# Patient Record
Sex: MALE | Race: WHITE | NOT HISPANIC OR LATINO | Employment: FULL TIME | URBAN - METROPOLITAN AREA
[De-identification: names, ages, dates, MRNs, and addresses within clinical notes are randomized per-mention and may not be internally consistent; named-entity substitution may affect disease eponyms.]

---

## 2022-10-27 ENCOUNTER — HOSPITAL ENCOUNTER (EMERGENCY)
Facility: HOSPITAL | Age: 27
Discharge: HOME | End: 2022-10-27
Attending: EMERGENCY MEDICINE
Payer: MEDICAID

## 2022-10-27 VITALS
WEIGHT: 200 LBS | RESPIRATION RATE: 18 BRPM | TEMPERATURE: 100 F | BODY MASS INDEX: 31.39 KG/M2 | SYSTOLIC BLOOD PRESSURE: 127 MMHG | HEIGHT: 67 IN | DIASTOLIC BLOOD PRESSURE: 85 MMHG | OXYGEN SATURATION: 98 % | HEART RATE: 98 BPM

## 2022-10-27 VITALS
OXYGEN SATURATION: 98 % | SYSTOLIC BLOOD PRESSURE: 148 MMHG | WEIGHT: 198 LBS | HEART RATE: 78 BPM | RESPIRATION RATE: 18 BRPM | DIASTOLIC BLOOD PRESSURE: 87 MMHG | BODY MASS INDEX: 31.08 KG/M2 | TEMPERATURE: 99 F | HEIGHT: 67 IN

## 2022-10-27 DIAGNOSIS — R31.9 HEMATURIA, UNSPECIFIED TYPE: ICD-10-CM

## 2022-10-27 DIAGNOSIS — J10.1 INFLUENZA A: Primary | ICD-10-CM

## 2022-10-27 DIAGNOSIS — R05.9 COUGH: ICD-10-CM

## 2022-10-27 DIAGNOSIS — Z20.2 STD EXPOSURE: Primary | ICD-10-CM

## 2022-10-27 LAB
BACTERIA #/AREA URNS HPF: NEGATIVE /HPF
BILIRUB UR QL STRIP: NEGATIVE
CLARITY UR: CLEAR
COLOR UR: YELLOW
GLUCOSE UR QL STRIP: NEGATIVE
GROUP A STREP, MOLECULAR: NEGATIVE
HGB UR QL STRIP: ABNORMAL
HYALINE CASTS #/AREA URNS LPF: 5 /LPF
INFLUENZA A, MOLECULAR: POSITIVE
INFLUENZA B, MOLECULAR: NEGATIVE
KETONES UR QL STRIP: NEGATIVE
LEUKOCYTE ESTERASE UR QL STRIP: NEGATIVE
MICROSCOPIC COMMENT: ABNORMAL
NITRITE UR QL STRIP: NEGATIVE
PH UR STRIP: 7 [PH] (ref 5–8)
PROT UR QL STRIP: ABNORMAL
RBC #/AREA URNS HPF: >100 /HPF (ref 0–4)
SARS-COV-2 RDRP RESP QL NAA+PROBE: NEGATIVE
SP GR UR STRIP: >1.03 (ref 1–1.03)
SPECIMEN SOURCE: ABNORMAL
SQUAMOUS #/AREA URNS HPF: 2 /HPF
URN SPEC COLLECT METH UR: ABNORMAL
UROBILINOGEN UR STRIP-ACNC: NEGATIVE EU/DL
WBC #/AREA URNS HPF: 4 /HPF (ref 0–5)

## 2022-10-27 PROCEDURE — 87651 STREP A DNA AMP PROBE: CPT | Performed by: NURSE PRACTITIONER

## 2022-10-27 PROCEDURE — 87502 INFLUENZA DNA AMP PROBE: CPT | Performed by: NURSE PRACTITIONER

## 2022-10-27 PROCEDURE — 25000003 PHARM REV CODE 250: Performed by: EMERGENCY MEDICINE

## 2022-10-27 PROCEDURE — 87591 N.GONORRHOEAE DNA AMP PROB: CPT | Performed by: NURSE PRACTITIONER

## 2022-10-27 PROCEDURE — 81001 URINALYSIS AUTO W/SCOPE: CPT | Performed by: NURSE PRACTITIONER

## 2022-10-27 PROCEDURE — 99283 EMERGENCY DEPT VISIT LOW MDM: CPT | Mod: 25

## 2022-10-27 PROCEDURE — U0002 COVID-19 LAB TEST NON-CDC: HCPCS | Performed by: NURSE PRACTITIONER

## 2022-10-27 PROCEDURE — 87491 CHLMYD TRACH DNA AMP PROBE: CPT | Performed by: NURSE PRACTITIONER

## 2022-10-27 RX ORDER — CEFTRIAXONE 1 G/1
500 INJECTION, POWDER, FOR SOLUTION INTRAMUSCULAR; INTRAVENOUS
Status: DISCONTINUED | OUTPATIENT
Start: 2022-10-27 | End: 2022-10-28 | Stop reason: HOSPADM

## 2022-10-27 RX ORDER — ONDANSETRON 4 MG/1
4 TABLET, FILM COATED ORAL EVERY 8 HOURS PRN
Qty: 15 TABLET | Refills: 0 | Status: SHIPPED | OUTPATIENT
Start: 2022-10-27 | End: 2023-03-22 | Stop reason: ALTCHOICE

## 2022-10-27 RX ORDER — DOXYCYCLINE 100 MG/1
100 CAPSULE ORAL 2 TIMES DAILY
Qty: 20 CAPSULE | Refills: 0 | Status: SHIPPED | OUTPATIENT
Start: 2022-10-27 | End: 2022-11-06

## 2022-10-27 RX ORDER — ONDANSETRON 4 MG/1
4 TABLET, ORALLY DISINTEGRATING ORAL
Status: COMPLETED | OUTPATIENT
Start: 2022-10-27 | End: 2022-10-27

## 2022-10-27 RX ORDER — DOXYCYCLINE 100 MG/1
100 CAPSULE ORAL EVERY 12 HOURS
Status: DISCONTINUED | OUTPATIENT
Start: 2022-10-27 | End: 2022-10-28 | Stop reason: HOSPADM

## 2022-10-27 RX ADMIN — ONDANSETRON 4 MG: 4 TABLET, ORALLY DISINTEGRATING ORAL at 01:10

## 2022-10-27 NOTE — ED PROVIDER NOTES
Encounter Date: 10/27/2022       History     Chief Complaint   Patient presents with    Generalized Body Aches     X 4 DAYS    Cough    Sore Throat    Nausea     27-year-old male presents emergency department with generalized body aches for the last 4 days.  He has had a cough, he says sore throat he has had nausea vomiting and diarrhea.  Says the nausea started yesterday 2 episodes of nonbloody nonbilious emesis yesterday.  One today.  He has had loose watery stool 4-5 episodes per day.  He has also had muscle aches and body aches.  He has had a headache.  He has had cough of green sputum.  No one else sick at home with similar symptoms.  He has no comorbidities.  No significant past medical history.    Review of patient's allergies indicates:   Allergen Reactions    Ibuprofen      No past medical history on file.  No past surgical history on file.  No family history on file.     Review of Systems   Constitutional:  Positive for chills, fatigue and fever.   HENT:  Positive for congestion and sore throat. Negative for ear pain.    Respiratory:  Positive for cough. Negative for shortness of breath.    Cardiovascular:  Negative for chest pain.   Gastrointestinal:  Positive for diarrhea, nausea and vomiting.   Genitourinary:  Negative for dysuria.   Musculoskeletal:  Positive for myalgias. Negative for back pain.   Skin:  Negative for rash.   Neurological:  Positive for headaches. Negative for seizures and weakness.   Hematological:  Does not bruise/bleed easily.   All other systems reviewed and are negative.    Physical Exam     Initial Vitals [10/27/22 1207]   BP Pulse Resp Temp SpO2   (!) 133/90 106 18 99.9 °F (37.7 °C) 97 %      MAP       --         Physical Exam    Nursing note and vitals reviewed.  Constitutional: He appears well-developed and well-nourished. He is not diaphoretic. No distress.   HENT:   Head: Normocephalic and atraumatic.   Mouth/Throat: Oropharynx is clear and moist. No oropharyngeal exudate.    Posterior pharyngeal erythema noted.  No exudate.   Eyes: Conjunctivae and EOM are normal. Pupils are equal, round, and reactive to light.   Neck: Neck supple. No tracheal deviation present.   No meningismus   Normal range of motion.  Cardiovascular:  Normal rate, regular rhythm, normal heart sounds and intact distal pulses.           No murmur heard.  Pulmonary/Chest: Breath sounds normal. No stridor. No respiratory distress. He has no wheezes. He has no rhonchi. He has no rales.   Abdominal: Abdomen is soft. Bowel sounds are normal. He exhibits no distension. There is no abdominal tenderness. There is no rebound and no guarding.   Musculoskeletal:         General: No tenderness or edema. Normal range of motion.      Cervical back: Normal range of motion and neck supple.     Lymphadenopathy:     He has no cervical adenopathy.   Neurological: He is alert and oriented to person, place, and time. He has normal strength. No cranial nerve deficit or sensory deficit. GCS score is 15. GCS eye subscore is 4. GCS verbal subscore is 5. GCS motor subscore is 6.   Skin: Skin is warm and dry. Capillary refill takes less than 2 seconds. No rash noted. No erythema. No pallor.   Psychiatric: He has a normal mood and affect. His behavior is normal. Thought content normal.       ED Course   Procedures  Labs Reviewed   INFLUENZA A AND B ANTIGEN - Abnormal; Notable for the following components:       Result Value    Influenza A, Molecular Positive (*)     All other components within normal limits    Narrative:     Specimen Source->Nasopharyngeal Swab   FLU A POS  critical result(s) called and verbal readback obtained   from Lizz Rodriguez RN-ED by CD3 10/27/2022 13:34   GROUP A STREP, MOLECULAR   SARS-COV-2 RNA AMPLIFICATION, QUAL          Results for orders placed or performed during the hospital encounter of 10/27/22   Group A Strep, Molecular    Specimen: Throat   Result Value Ref Range    Group A Strep, Molecular Negative  Negative   COVID-19 Rapid Screening   Result Value Ref Range    SARS-CoV-2 RNA, Amplification, Qual Negative Negative   Influenza antigen Nasopharyngeal Swab   Result Value Ref Range    Influenza A, Molecular Positive (AA) Negative    Influenza B, Molecular Negative Negative    Flu A & B Source Nasal swab        Imaging Results              X-Ray Chest PA And Lateral (Final result)  Result time 10/27/22 13:56:15      Final result by Hernando Fernandez MD (10/27/22 13:56:15)                   Narrative:    XR CHEST 2 VIEWS    CLINICAL HISTORY:  27 years Male cough, chills, sore throat, body aches    COMPARISON: None    FINDINGS: Cardiomediastinal silhouette is within normal limits. Lungs are normally expanded with no airspace consolidation. No pleural effusion or pneumothorax. No acute osseous abnormality.    IMPRESSION: No acute pulmonary process.    Electronically signed by:  Hernando Fernandez MD  10/27/2022 1:56 PM CDT Workstation: [id number]                                  Results for orders placed or performed during the hospital encounter of 10/27/22   Group A Strep, Molecular    Specimen: Throat   Result Value Ref Range    Group A Strep, Molecular Negative Negative   COVID-19 Rapid Screening   Result Value Ref Range    SARS-CoV-2 RNA, Amplification, Qual Negative Negative   Influenza antigen Nasopharyngeal Swab   Result Value Ref Range    Influenza A, Molecular Positive (AA) Negative    Influenza B, Molecular Negative Negative    Flu A & B Source Nasal swab      X-Ray Chest PA And Lateral   Final Result             Medications   ondansetron disintegrating tablet 4 mg (4 mg Oral Given 10/27/22 1353)     Medical Decision Making:   Clinical Tests:   Lab Tests: Ordered and Reviewed  Radiological Study: Ordered and Reviewed  Medical Tests: Ordered and Reviewed  ED Management:  27-year-old male presents emergency department with flu-like symptoms.  He is positive for influenza A.  He is out of window for Tamiflu.  He  is not hypoxic or tachycardic.  He is well-appearing, nontoxic.  He is not appear clinically dehydrated.  Felt better after receiving Zofran emergency department.  He will be discharged home follow-up with his primary care provider.    I had a detailed discussion with the patient and/or guardian regarding: The historical points, exam findings, and diagnostic results supporting the discharge diagnosis, lab results, pertinent radiology results, and the need for outpatient follow-up, for definitive care with a family practitioner and to return to the emergency department if symptoms worsen or persist or if there are any questions or concerns that arise at home. All questions have been answered in detail. Strict return to Emergency Department precautions have been provided    A dictation software program was used for this note.  Please expect some simple typographical  errors in this note.                         Clinical Impression:   Final diagnoses:  [R05.9] Cough  [J10.1] Influenza A (Primary)        ED Disposition Condition    Discharge Stable          ED Prescriptions       Medication Sig Dispense Start Date End Date Auth. Provider    ondansetron (ZOFRAN) 4 MG tablet Take 1 tablet (4 mg total) by mouth every 8 (eight) hours as needed for Nausea. 15 tablet 10/27/2022 -- Justino Mooney DO          Follow-up Information       Follow up With Specialties Details Why Contact Info Additional Information    [facility] - Emergency Dept Emergency Medicine  If symptoms worsen [address] [facility] [phone] [phone] 1st floor    Your PCP  In 3 days                Justino Mooney DO  10/27/22 8710

## 2022-10-27 NOTE — DISCHARGE INSTRUCTIONS
RETURN TO EMERGENCY DEPARTMENT WITHOUT FAIL, IF YOUR SYMPTOMS WORSEN, IF YOU GET NEW OR DIFFERENT SYMPTOMS, IF YOU ARE UNABLE TO FOLLOW UP AS DIRECTED, OR IF YOU HAVE ANY CONCERNS OR WORRIES.    Take Zofran as needed for nausea and vomiting.  Take Tylenol and ibuprofen as needed for fever.  Follow-up with primary care provider.

## 2022-10-28 NOTE — ED PROVIDER NOTES
"Source of History:  Patient, aunt    Chief complaint:  Exposure to STD (Pt states he was exposed to HIV ) and Hematuria      HPI:  Darshan Segura is a 27 y.o. male presenting with hematuria and STD exposure.  Patient was seen earlier today for fever and congestion.  Patient states he was given Zofran in the ED and when he returned home he noticed blood in his urine.  Patient states he is concerned for exposure to HIV but never had any symptoms and wants to be tested.      This is the extent to the patients complaints today here in the emergency department.    ROS: As per HPI and below:  Constitutional: No fever.  No chills.  Eyes: No visual changes.   ENT: No sore throat. No ear pain.  Urinary: No abnormal urination.  Positive for hematuria  MSK: No back pain. No joint pain.   Integument: No rashes or lesions.    Review of patient's allergies indicates:   Allergen Reactions    Ibuprofen        PMH:  As per HPI and below:  No past medical history on file.  No past surgical history on file.         Physical Exam:    BP (!) 148/87 (BP Location: Right arm, Patient Position: Sitting)   Pulse 78   Temp 98.9 °F (37.2 °C) (Oral)   Resp 18   Ht 5' 7" (1.702 m)   Wt 89.8 kg (198 lb)   SpO2 98%   BMI 31.01 kg/m²   Nursing note and vital signs reviewed.  Constitutional: No acute distress.  Nontoxic  Eyes: No conjunctival injection. Extraocular muscles intact.  ENT: Normal phonation.  Musculoskeletal: Good range of motion all joints.  No deformities.  Neck supple.  No meningismus. Neurovascularly intact.  Skin: No rashes seen.  Good turgor.  No abrasions.  No ecchymoses.  Psych:  Alert and oriented x 3.  Appropriate, conversant.    Labs Reviewed   URINALYSIS, REFLEX TO URINE CULTURE - Abnormal; Notable for the following components:       Result Value    Specific Gravity, UA >1.030 (*)     Protein, UA 3+ (*)     Occult Blood UA 3+ (*)     All other components within normal limits    Narrative:     Specimen Source->Urine "   URINALYSIS MICROSCOPIC - Abnormal; Notable for the following components:    RBC, UA >100 (*)     Hyaline Casts, UA 5 (*)     All other components within normal limits    Narrative:     Specimen Source->Urine   C. TRACHOMATIS/N. GONORRHOEAE BY AMP DNA   HIV 1 / 2 ANTIBODY     I decided to obtain the patient's medical records.  Summary of Medical Records:    MDM/ Differential Dx:   Emergent evaluation of a 26 yo male presenting for hematuria.  No pain with urination.  Pt states he is concerns for possible exposure to HIV and STDs. Of note he was seen earlier today for cough, congestions and discharged home.   On exam pt is A&Ox3. VSS. Nonfebrile and nontoxic appearing.  Mucous membranes pink and moist. Tonsils with no redness, erythema or exudates. Breath sounds clear bilaterally.  Abdomen soft and nontender. No rebound or guarding appreciated on exam.   BS WNL.  Pt speaking in full sentences.  Steady gait appreciated. Cap refill < 3 seconds.        ED Course as of 10/28/22 0001   u Oct 27, 2022   2245 Alerted by nursing staff that patient family wants to see a physician and not a nurse practitioner.  Dr. Markham aware. [RZ]   6465 UA shows 3+ blood.  No leukocytosis.  No signs of infection. Pt updated on results.  Advised that we will call him with HIV and STD results if they are positive.  Will treat for GC/Chlamydia.  Pt advised he could download the my [facility] apt to get his results as soon as they are released.  Patient is in agreeance to this plan.  Strict return to ED precautions discussed.  Patient verbalized understanding.  All questions and concerns addressed.    Patient is hemodynamically stable, vital signs are normal. Discharge instructions given. Return to ED precautions discussed. Follow up as directed. Pt verbalized understanding of this plan.  Pt is stable for discharge.     [RZ]      ED Course User Index  [RZ] Brionna Chun NP               Diagnostic Impression:    1. STD exposure    2.  Hematuria, unspecified type         ED Disposition Condition    Discharge Stable            ED Prescriptions       Medication Sig Dispense Start Date End Date Auth. Provider    doxycycline (VIBRAMYCIN) 100 MG Cap Take 1 capsule (100 mg total) by mouth 2 (two) times daily. for 10 days 20 capsule 10/27/2022 11/6/2022 Brionna Chun NP          Follow-up Information       Follow up With Specialties Details Why Contact Info    [facility]  Schedule an appointment as soon as possible for a visit  As needed [address] [address] [address]  [phone]               Brionna Chun NP  10/28/22 0017

## 2022-10-28 NOTE — DISCHARGE INSTRUCTIONS
You were seen and evaluated in the ER today.  We have treated you for STD exposure.  We have tested you for HIV and you will be contacted if your test is positive.  Please follow-up with your PCP as needed.  Please return to the ED for any worsening symptoms such as chest pain, shortness of breath, fever not controlled with Tylenol or ibuprofen or uncontrolled pain.      Our goal in the emergency department is to always give you outstanding care and exceptional service. You may receive a survey by mail or e-mail in the next week regarding your experience in our ED. We would greatly appreciate your completing and returning the survey. Your feedback provides us with a way to recognize our staff who give very good care and it helps us learn how to improve when your experience was below our aspiration of excellence.

## 2022-10-30 LAB
CHLAMYDIA, AMPLIFIED DNA: NEGATIVE
N GONORRHOEAE, AMPLIFIED DNA: NEGATIVE

## 2022-11-01 ENCOUNTER — OFFICE VISIT (OUTPATIENT)
Dept: URGENT CARE | Facility: CLINIC | Age: 27
End: 2022-11-01
Payer: MEDICAID

## 2022-11-01 ENCOUNTER — TELEPHONE (OUTPATIENT)
Dept: UROLOGY | Facility: CLINIC | Age: 27
End: 2022-11-01
Payer: MEDICAID

## 2022-11-01 VITALS
WEIGHT: 184 LBS | DIASTOLIC BLOOD PRESSURE: 89 MMHG | SYSTOLIC BLOOD PRESSURE: 134 MMHG | HEIGHT: 67 IN | OXYGEN SATURATION: 98 % | BODY MASS INDEX: 28.88 KG/M2 | HEART RATE: 71 BPM | TEMPERATURE: 98 F | RESPIRATION RATE: 20 BRPM

## 2022-11-01 DIAGNOSIS — Z20.2 POTENTIAL EXPOSURE TO STD: ICD-10-CM

## 2022-11-01 DIAGNOSIS — R31.9 HEMATURIA, UNSPECIFIED TYPE: Primary | ICD-10-CM

## 2022-11-01 LAB
BILIRUB UR QL STRIP: NEGATIVE
GLUCOSE UR QL STRIP: NEGATIVE
KETONES UR QL STRIP: NEGATIVE
LEUKOCYTE ESTERASE UR QL STRIP: NEGATIVE
PH, POC UA: 5.5
POC BLOOD, URINE: POSITIVE
POC NITRATES, URINE: NEGATIVE
PROT UR QL STRIP: POSITIVE
SP GR UR STRIP: 1.02 (ref 1–1.03)
UROBILINOGEN UR STRIP-ACNC: NORMAL (ref 0.3–2.2)

## 2022-11-01 PROCEDURE — 1159F PR MEDICATION LIST DOCUMENTED IN MEDICAL RECORD: ICD-10-PCS | Mod: CPTII,S$GLB,, | Performed by: NURSE PRACTITIONER

## 2022-11-01 PROCEDURE — 1160F RVW MEDS BY RX/DR IN RCRD: CPT | Mod: CPTII,S$GLB,, | Performed by: NURSE PRACTITIONER

## 2022-11-01 PROCEDURE — 81003 POCT URINALYSIS, DIPSTICK, AUTOMATED, W/O SCOPE: ICD-10-PCS | Mod: QW,S$GLB,, | Performed by: NURSE PRACTITIONER

## 2022-11-01 PROCEDURE — 3075F SYST BP GE 130 - 139MM HG: CPT | Mod: CPTII,S$GLB,, | Performed by: NURSE PRACTITIONER

## 2022-11-01 PROCEDURE — 3079F PR MOST RECENT DIASTOLIC BLOOD PRESSURE 80-89 MM HG: ICD-10-PCS | Mod: CPTII,S$GLB,, | Performed by: NURSE PRACTITIONER

## 2022-11-01 PROCEDURE — 1159F MED LIST DOCD IN RCRD: CPT | Mod: CPTII,S$GLB,, | Performed by: NURSE PRACTITIONER

## 2022-11-01 PROCEDURE — 3075F PR MOST RECENT SYSTOLIC BLOOD PRESS GE 130-139MM HG: ICD-10-PCS | Mod: CPTII,S$GLB,, | Performed by: NURSE PRACTITIONER

## 2022-11-01 PROCEDURE — 1160F PR REVIEW ALL MEDS BY PRESCRIBER/CLIN PHARMACIST DOCUMENTED: ICD-10-PCS | Mod: CPTII,S$GLB,, | Performed by: NURSE PRACTITIONER

## 2022-11-01 PROCEDURE — 99204 OFFICE O/P NEW MOD 45 MIN: CPT | Mod: S$GLB,,, | Performed by: NURSE PRACTITIONER

## 2022-11-01 PROCEDURE — 3079F DIAST BP 80-89 MM HG: CPT | Mod: CPTII,S$GLB,, | Performed by: NURSE PRACTITIONER

## 2022-11-01 PROCEDURE — 81003 URINALYSIS AUTO W/O SCOPE: CPT | Mod: QW,S$GLB,, | Performed by: NURSE PRACTITIONER

## 2022-11-01 PROCEDURE — 99204 PR OFFICE/OUTPT VISIT, NEW, LEVL IV, 45-59 MIN: ICD-10-PCS | Mod: S$GLB,,, | Performed by: NURSE PRACTITIONER

## 2022-11-01 NOTE — PROGRESS NOTES
"Subjective:       Patient ID: Darshan Segura is a 27 y.o. male.    Vitals:  height is 5' 7" (1.702 m) and weight is 83.5 kg (184 lb). His temperature is 97.8 °F (36.6 °C). His blood pressure is 134/89 and his pulse is 71. His respiration is 20 and oxygen saturation is 98%.     Chief Complaint: Exposure to STD    Pt states" c/o dripping blood from penis that has been going on for a week. No burning, or itching just the dripping of blood from penis after urination. Has been taking Doxycycline and reports nausea from this. Has been taking ondansetron to keep medication down.    Emesis   Pertinent negatives include no abdominal pain, chest pain, chills, coughing, diarrhea, dizziness or fever.     Constitution: Negative for chills and fever.   HENT:  Negative for sore throat.    Cardiovascular:  Negative for chest pain, palpitations and sob on exertion.   Respiratory:  Negative for cough and shortness of breath.    Gastrointestinal:  Positive for vomiting. Negative for abdominal pain, nausea and diarrhea.   Genitourinary:  Positive for hematuria. Negative for dysuria, frequency, urgency, flank pain, bladder incontinence, genital sore, penile pain and testicular pain.   Musculoskeletal:  Negative for back pain.   Skin:  Negative for rash.   Neurological:  Negative for dizziness, light-headedness, passing out, disorientation and altered mental status.   Psychiatric/Behavioral:  Negative for altered mental status, disorientation and confusion.      Objective:      Physical Exam   Constitutional: He is oriented to person, place, and time. He appears well-developed. He is cooperative.  Non-toxic appearance. He does not appear ill. No distress.   HENT:   Head: Normocephalic and atraumatic.   Ears:   Right Ear: External ear normal.   Left Ear: External ear normal.   Nose: Nose normal.   Mouth/Throat: Oropharynx is clear and moist and mucous membranes are normal. Mucous membranes are moist. Oropharynx is clear.   Eyes: " Conjunctivae and lids are normal.   Neck: Trachea normal and phonation normal. Neck supple.   Cardiovascular: Normal rate, regular rhythm, normal heart sounds and normal pulses.   Pulmonary/Chest: Effort normal and breath sounds normal.   Abdominal: Normal appearance and bowel sounds are normal. He exhibits no distension and no mass. Soft. There is no abdominal tenderness. There is no rebound, no guarding, no left CVA tenderness and no right CVA tenderness. No hernia.   Musculoskeletal:         General: No deformity.   Neurological: He is alert and oriented to person, place, and time. He has normal strength and normal reflexes. No sensory deficit.   Skin: Skin is warm, dry, intact and not diaphoretic. Capillary refill takes 2 to 3 seconds.   Psychiatric: His speech is normal and behavior is normal. Judgment and thought content normal.   Nursing note and vitals reviewed.      Assessment:       1. Hematuria, unspecified type    2. Potential exposure to STD          Plan:       Nuab pending    Hematuria, unspecified type  -     Ambulatory referral/consult to Urology  -     CULTURE, URINE    Potential exposure to STD  -     POCT Urinalysis, Dipstick, Automated, W/O Scope  -     Cancel: Nuab Vaginitis Plus (VG+)  UA reviewed. Positive blood and protein. Negative nitrates, leukocytes, and ketones.       I have discussed the test results and physical exam findings with the patient. I am referring to urology for further workup for painless hematuria. The patient is hemodynamically stable at this time and I do not see any evidence for emergency care at this time. We discussed symptom monitoring, conservative care methods, medication use, and follow up orders. The patient verbalized understanding and agreement with the plan of care.         Continue taking doxycycline to completion. Take with food and ondansetron for better tolerance. Take each dose with a full glass of water.  A urine culture and other testing has been  ordered on your urine. Someone from this office will contact you with the results.  Follow up with Urology referral. Someone will call you and schedule you  for an appointment.  If you become weak, dizzy, short of breath, or have chest pain, go to the ER for evaluation.  Return to clinic for any new concern.

## 2022-11-01 NOTE — TELEPHONE ENCOUNTER
Office received referral from Dr. Tuttle for hematuria. Called patient regarding referral. No answer. Unable to LMOM. Mailbox full.

## 2022-11-01 NOTE — PATIENT INSTRUCTIONS
Continue taking doxycycline to completion. Take with food and ondansetron for better tolerance. Take each dose with a full glass of water.  A urine culture and other testing has been ordered on your urine. Someone from this office will contact you with the results.  Follow up with Urology referral. Someone will call you and schedule you  for an appointment.  If you become weak, dizzy, short of breath, or have chest pain, go to the ER for evaluation.  Return to clinic for any new concern.

## 2022-11-03 LAB
BACTERIA UR CULT: NO GROWTH
BACTERIA UR CULT: NORMAL

## 2022-11-17 ENCOUNTER — OFFICE VISIT (OUTPATIENT)
Dept: FAMILY MEDICINE | Facility: CLINIC | Age: 27
End: 2022-11-17
Payer: MEDICAID

## 2022-11-17 VITALS
HEART RATE: 52 BPM | HEIGHT: 67 IN | WEIGHT: 188.69 LBS | RESPIRATION RATE: 16 BRPM | OXYGEN SATURATION: 99 % | BODY MASS INDEX: 29.62 KG/M2 | DIASTOLIC BLOOD PRESSURE: 66 MMHG | TEMPERATURE: 98 F | SYSTOLIC BLOOD PRESSURE: 112 MMHG

## 2022-11-17 DIAGNOSIS — R31.0 GROSS HEMATURIA: Primary | ICD-10-CM

## 2022-11-17 DIAGNOSIS — Z20.2 POSSIBLE EXPOSURE TO STD: ICD-10-CM

## 2022-11-17 DIAGNOSIS — Z23 NEEDS FLU SHOT: ICD-10-CM

## 2022-11-17 DIAGNOSIS — Z00.00 ROUTINE HEALTH MAINTENANCE: ICD-10-CM

## 2022-11-17 DIAGNOSIS — R00.1 BRADYCARDIA WITH 41-50 BEATS PER MINUTE: ICD-10-CM

## 2022-11-17 LAB
BILIRUB UR QL STRIP: NEGATIVE
GLUCOSE UR QL STRIP: NEGATIVE
KETONES UR QL STRIP: NEGATIVE
LEUKOCYTE ESTERASE UR QL STRIP: NEGATIVE
PH, POC UA: 6 (ref 5–8.5)
POC BLOOD, URINE: NEGATIVE
POC NITRATES, URINE: NEGATIVE
PROT UR QL STRIP: POSITIVE
SP GR UR STRIP: 1.02 (ref 1–1.03)
UROBILINOGEN UR STRIP-ACNC: NORMAL (ref 0.2–8)

## 2022-11-17 PROCEDURE — 99213 OFFICE O/P EST LOW 20 MIN: CPT | Performed by: NURSE PRACTITIONER

## 2022-11-17 PROCEDURE — 90471 IMMUNIZATION ADMIN: CPT | Mod: PBBFAC | Performed by: NURSE PRACTITIONER

## 2022-11-17 PROCEDURE — 3008F PR BODY MASS INDEX (BMI) DOCUMENTED: ICD-10-PCS | Mod: CPTII,,, | Performed by: NURSE PRACTITIONER

## 2022-11-17 PROCEDURE — 1159F PR MEDICATION LIST DOCUMENTED IN MEDICAL RECORD: ICD-10-PCS | Mod: CPTII,,, | Performed by: NURSE PRACTITIONER

## 2022-11-17 PROCEDURE — 3078F DIAST BP <80 MM HG: CPT | Mod: CPTII,,, | Performed by: NURSE PRACTITIONER

## 2022-11-17 PROCEDURE — 3078F PR MOST RECENT DIASTOLIC BLOOD PRESSURE < 80 MM HG: ICD-10-PCS | Mod: CPTII,,, | Performed by: NURSE PRACTITIONER

## 2022-11-17 PROCEDURE — 99204 OFFICE O/P NEW MOD 45 MIN: CPT | Mod: S$PBB,,, | Performed by: NURSE PRACTITIONER

## 2022-11-17 PROCEDURE — 1159F MED LIST DOCD IN RCRD: CPT | Mod: CPTII,,, | Performed by: NURSE PRACTITIONER

## 2022-11-17 PROCEDURE — 3008F BODY MASS INDEX DOCD: CPT | Mod: CPTII,,, | Performed by: NURSE PRACTITIONER

## 2022-11-17 PROCEDURE — 3074F SYST BP LT 130 MM HG: CPT | Mod: CPTII,,, | Performed by: NURSE PRACTITIONER

## 2022-11-17 PROCEDURE — 99204 PR OFFICE/OUTPT VISIT, NEW, LEVL IV, 45-59 MIN: ICD-10-PCS | Mod: S$PBB,,, | Performed by: NURSE PRACTITIONER

## 2022-11-17 PROCEDURE — 3074F PR MOST RECENT SYSTOLIC BLOOD PRESSURE < 130 MM HG: ICD-10-PCS | Mod: CPTII,,, | Performed by: NURSE PRACTITIONER

## 2022-11-17 PROCEDURE — 81003 URINALYSIS AUTO W/O SCOPE: CPT | Mod: PBBFAC | Performed by: NURSE PRACTITIONER

## 2022-11-17 NOTE — LETTER
November 17, 2022      [facility] / Internal Medicine  [address] [address]  Phone: [phone]  Fax: [phone]       Patient: Darshan Segura   YOB: 1995  Date of Visit: 11/17/2022    To Whom It May Concern:    Laurie Segura  was at [facility] on 11/17/2022. The patient may return to work/school on 11/18/2022 with no restrictions. If you have any questions or concerns, or if I can be of further assistance, please do not hesitate to contact me.    Sincerely,        Yanet Nash MA

## 2022-11-17 NOTE — PROGRESS NOTES
Patient ID: Darshan Segura is a 27 y.o. male.    Chief Complaint: Hematuria and Transitional Care    Mr. Segura presents today to establish care with me as his PCP. He states he was seen several weeks ago in ED for blood in urine. He states he was possibly exposed to STD and at that time he was started on Doxycycline. He states it happened a few times after starting antibiotics but it subsided and has not happened since. He denies any pain on urination. He also denies fever, chills, night sweats or infectious morbidity. We reviewed his health history and he states he is overall healthy. His family history was reviewed as well with no pertinent issues.     Hematuria  This is a new problem. The current episode started 1 to 4 weeks ago. The problem has been resolved since onset. He describes the hematuria as gross hematuria. The hematuria occurs during the terminal portion of his urinary stream. He reports no clotting in his urine stream. His pain is at a severity of 0/10. He is experiencing no pain. He describes his urine color as tea colored. Irritative symptoms do not include frequency, nocturia or urgency. Pertinent negatives include no abdominal pain, dysuria, facial swelling, fever, flank pain, inability to urinate, nausea, vomiting or sore throat. He is sexually active. His past medical history is significant for recent infection and STDs.     History reviewed. No pertinent past medical history.  History reviewed. No pertinent surgical history.      Tobacco History:  reports that he has been smoking cigarettes and vaping with nicotine. He has a 7.00 pack-year smoking history. He does not have any smokeless tobacco history on file.      Review of patient's allergies indicates:   Allergen Reactions    Ibuprofen        Current Outpatient Medications:     ondansetron (ZOFRAN) 4 MG tablet, Take 1 tablet (4 mg total) by mouth every 8 (eight) hours as needed for Nausea. (Patient not taking: Reported on 11/17/2022), Disp:  "15 tablet, Rfl: 0    Review of Systems   Constitutional:  Negative for activity change, appetite change, fatigue and fever.   HENT:  Negative for congestion, dental problem, facial swelling, nosebleeds, postnasal drip, rhinorrhea, sinus pain, sore throat and tinnitus.    Eyes:  Negative for pain, discharge, itching and visual disturbance.   Respiratory:  Negative for cough, chest tightness, shortness of breath and wheezing.    Cardiovascular:  Negative for chest pain.   Gastrointestinal:  Negative for abdominal pain, blood in stool, constipation, diarrhea, nausea and vomiting.   Endocrine: Negative for cold intolerance, heat intolerance, polydipsia, polyphagia and polyuria.   Genitourinary:  Positive for hematuria. Negative for difficulty urinating, dysuria, flank pain, frequency, genital sores, nocturia and urgency.   Musculoskeletal:  Negative for arthralgias and myalgias.   Skin:  Negative for rash and wound.   Allergic/Immunologic: Negative for environmental allergies and food allergies.   Neurological:  Negative for dizziness, light-headedness and headaches.   Hematological:  Negative for adenopathy. Does not bruise/bleed easily.   Psychiatric/Behavioral:  Negative for behavioral problems, confusion, decreased concentration, sleep disturbance and suicidal ideas. The patient is not nervous/anxious and is not hyperactive.         Objective:      Vitals:    11/17/22 1414 11/17/22 1441   BP: 112/66    Pulse: (!) 48 (!) 52   Resp: 16    Temp: 98.1 °F (36.7 °C)    TempSrc: Oral    SpO2: 99%    Weight: 85.6 kg (188 lb 11.2 oz)    Height: 5' 7" (1.702 m)      Physical Exam  Vitals reviewed.   Constitutional:       General: He is not in acute distress.     Appearance: Normal appearance. He is normal weight. He is not ill-appearing, toxic-appearing or diaphoretic.   HENT:      Head: Normocephalic and atraumatic.      Right Ear: Tympanic membrane and external ear normal. There is no impacted cerumen.      Left Ear: " Tympanic membrane and external ear normal. There is no impacted cerumen.      Nose: Nose normal. No congestion or rhinorrhea.      Mouth/Throat:      Mouth: Mucous membranes are moist.      Pharynx: Oropharynx is clear. No oropharyngeal exudate or posterior oropharyngeal erythema.   Eyes:      General: No scleral icterus.        Right eye: No discharge.         Left eye: No discharge.      Extraocular Movements: Extraocular movements intact.      Conjunctiva/sclera: Conjunctivae normal.      Pupils: Pupils are equal, round, and reactive to light.   Cardiovascular:      Rate and Rhythm: Normal rate and regular rhythm.      Pulses: Normal pulses.      Heart sounds: Normal heart sounds. No murmur heard.    No friction rub. No gallop.   Pulmonary:      Effort: Pulmonary effort is normal. No respiratory distress.      Breath sounds: Normal breath sounds. No wheezing, rhonchi or rales.   Chest:      Chest wall: No tenderness.   Abdominal:      General: Abdomen is flat. Bowel sounds are normal.      Palpations: Abdomen is soft. There is no mass.      Tenderness: There is no abdominal tenderness. There is no guarding or rebound.   Musculoskeletal:         General: No swelling or signs of injury. Normal range of motion.      Cervical back: Normal range of motion and neck supple. No rigidity or tenderness.      Right lower leg: No edema.      Left lower leg: No edema.   Skin:     General: Skin is warm and dry.      Capillary Refill: Capillary refill takes less than 2 seconds.      Coloration: Skin is not pale.      Findings: No bruising or erythema.   Neurological:      General: No focal deficit present.      Mental Status: He is alert and oriented to person, place, and time. Mental status is at baseline.      Motor: No weakness.      Coordination: Coordination normal.      Gait: Gait normal.   Psychiatric:         Mood and Affect: Mood normal.         Behavior: Behavior normal.         Thought Content: Thought content  normal.         Judgment: Judgment normal.         Assessment:       1. Gross hematuria    2. Routine health maintenance    3. Needs flu shot    4. Possible exposure to STD    5. Bradycardia with 41-50 beats per minute           Plan:       Gross hematuria  -     POCT Urinalysis, Dipstick, Automated, W/O Scope  -     Urinalysis, Reflex to Urine Culture Urine, Clean Catch; Future; Expected date: 11/17/2022    Routine health maintenance  -     CBC auto differential; Future; Expected date: 11/17/2022  -     Comprehensive Metabolic Panel; Future; Expected date: 11/17/2022  -     Lipid Panel; Future; Expected date: 11/17/2022  -     TSH; Future; Expected date: 11/17/2022    Needs flu shot  -     Influenza - Quadrivalent *Preferred* (6 months+) (PF)    Possible exposure to STD  -     Hepatitis C Antibody; Future; Expected date: 11/17/2022  -     HIV 1/2 Ag/Ab (4th Gen); Future; Expected date: 11/17/2022    Bradycardia with 41-50 beats per minute  -     EKG 12-lead; Future; Expected date: 11/24/2022      Follow up in about 3 months (around 2/17/2023), or if symptoms worsen or fail to improve, for Lab review/Hematuria.        11/17/2022 Tiera Rosa NP

## 2023-02-20 ENCOUNTER — LAB VISIT (OUTPATIENT)
Dept: LAB | Facility: HOSPITAL | Age: 28
End: 2023-02-20
Attending: NURSE PRACTITIONER
Payer: MEDICAID

## 2023-02-20 DIAGNOSIS — Z20.2 POSSIBLE EXPOSURE TO STD: ICD-10-CM

## 2023-02-20 DIAGNOSIS — Z00.00 ROUTINE HEALTH MAINTENANCE: ICD-10-CM

## 2023-02-20 DIAGNOSIS — R31.0 GROSS HEMATURIA: ICD-10-CM

## 2023-02-20 LAB
ALBUMIN SERPL BCP-MCNC: 4.5 G/DL (ref 3.5–5.2)
ALP SERPL-CCNC: 56 U/L (ref 55–135)
ALT SERPL W/O P-5'-P-CCNC: 20 U/L (ref 10–44)
ANION GAP SERPL CALC-SCNC: 10 MMOL/L (ref 8–16)
AST SERPL-CCNC: 21 U/L (ref 10–40)
BASOPHILS # BLD AUTO: 0.01 K/UL (ref 0–0.2)
BASOPHILS NFR BLD: 0.3 % (ref 0–1.9)
BILIRUB SERPL-MCNC: 0.6 MG/DL (ref 0.1–1)
BILIRUB UR QL STRIP: NEGATIVE
BUN SERPL-MCNC: 16 MG/DL (ref 6–20)
CALCIUM SERPL-MCNC: 9.8 MG/DL (ref 8.7–10.5)
CHLORIDE SERPL-SCNC: 102 MMOL/L (ref 95–110)
CHOLEST SERPL-MCNC: 299 MG/DL (ref 120–199)
CHOLEST/HDLC SERPL: 5.1 {RATIO} (ref 2–5)
CLARITY UR: CLEAR
CO2 SERPL-SCNC: 25 MMOL/L (ref 23–29)
COLOR UR: YELLOW
CREAT SERPL-MCNC: 1.3 MG/DL (ref 0.5–1.4)
DIFFERENTIAL METHOD: ABNORMAL
EOSINOPHIL # BLD AUTO: 0 K/UL (ref 0–0.5)
EOSINOPHIL NFR BLD: 1 % (ref 0–8)
ERYTHROCYTE [DISTWIDTH] IN BLOOD BY AUTOMATED COUNT: 15.6 % (ref 11.5–14.5)
EST. GFR  (NO RACE VARIABLE): >60 ML/MIN/1.73 M^2
GLUCOSE SERPL-MCNC: 96 MG/DL (ref 70–110)
GLUCOSE UR QL STRIP: NEGATIVE
HCT VFR BLD AUTO: 43.6 % (ref 40–54)
HDLC SERPL-MCNC: 59 MG/DL (ref 40–75)
HDLC SERPL: 19.7 % (ref 20–50)
HGB BLD-MCNC: 13.4 G/DL (ref 14–18)
HGB UR QL STRIP: NEGATIVE
IMM GRANULOCYTES # BLD AUTO: 0.01 K/UL (ref 0–0.04)
IMM GRANULOCYTES NFR BLD AUTO: 0.3 % (ref 0–0.5)
KETONES UR QL STRIP: NEGATIVE
LDLC SERPL CALC-MCNC: 233.4 MG/DL (ref 63–159)
LEUKOCYTE ESTERASE UR QL STRIP: NEGATIVE
LYMPHOCYTES # BLD AUTO: 1.5 K/UL (ref 1–4.8)
LYMPHOCYTES NFR BLD: 38.2 % (ref 18–48)
MCH RBC QN AUTO: 22.4 PG (ref 27–31)
MCHC RBC AUTO-ENTMCNC: 30.7 G/DL (ref 32–36)
MCV RBC AUTO: 73 FL (ref 82–98)
MONOCYTES # BLD AUTO: 0.3 K/UL (ref 0.3–1)
MONOCYTES NFR BLD: 6.9 % (ref 4–15)
NEUTROPHILS # BLD AUTO: 2.1 K/UL (ref 1.8–7.7)
NEUTROPHILS NFR BLD: 53.3 % (ref 38–73)
NITRITE UR QL STRIP: NEGATIVE
NONHDLC SERPL-MCNC: 240 MG/DL
NRBC BLD-RTO: 0 /100 WBC
PH UR STRIP: 7 [PH] (ref 5–8)
PLATELET # BLD AUTO: 179 K/UL (ref 150–450)
PMV BLD AUTO: 9.9 FL (ref 9.2–12.9)
POTASSIUM SERPL-SCNC: 3.7 MMOL/L (ref 3.5–5.1)
PROT SERPL-MCNC: 7.5 G/DL (ref 6–8.4)
PROT UR QL STRIP: ABNORMAL
RBC # BLD AUTO: 5.97 M/UL (ref 4.6–6.2)
SODIUM SERPL-SCNC: 137 MMOL/L (ref 136–145)
SP GR UR STRIP: 1.02 (ref 1–1.03)
TRIGL SERPL-MCNC: 33 MG/DL (ref 30–150)
TSH SERPL DL<=0.005 MIU/L-ACNC: 0.77 UIU/ML (ref 0.34–5.6)
URN SPEC COLLECT METH UR: ABNORMAL
UROBILINOGEN UR STRIP-ACNC: NEGATIVE EU/DL
WBC # BLD AUTO: 3.9 K/UL (ref 3.9–12.7)

## 2023-02-20 PROCEDURE — 81003 URINALYSIS AUTO W/O SCOPE: CPT | Performed by: NURSE PRACTITIONER

## 2023-02-20 PROCEDURE — 87389 HIV-1 AG W/HIV-1&-2 AB AG IA: CPT | Performed by: NURSE PRACTITIONER

## 2023-02-20 PROCEDURE — 36415 COLL VENOUS BLD VENIPUNCTURE: CPT | Performed by: NURSE PRACTITIONER

## 2023-02-20 PROCEDURE — 80053 COMPREHEN METABOLIC PANEL: CPT | Performed by: NURSE PRACTITIONER

## 2023-02-20 PROCEDURE — 86803 HEPATITIS C AB TEST: CPT | Performed by: NURSE PRACTITIONER

## 2023-02-20 PROCEDURE — 85025 COMPLETE CBC W/AUTO DIFF WBC: CPT | Performed by: NURSE PRACTITIONER

## 2023-02-20 PROCEDURE — 80061 LIPID PANEL: CPT | Performed by: NURSE PRACTITIONER

## 2023-02-20 PROCEDURE — 84443 ASSAY THYROID STIM HORMONE: CPT | Performed by: NURSE PRACTITIONER

## 2023-02-23 LAB
HCV AB S/CO SERPL IA: NON REACTIVE
HIV 1+2 AB+HIV1 P24 AG SERPL QL IA: NON REACTIVE

## 2023-03-22 ENCOUNTER — OFFICE VISIT (OUTPATIENT)
Dept: FAMILY MEDICINE | Facility: CLINIC | Age: 28
End: 2023-03-22
Payer: MEDICAID

## 2023-03-22 VITALS
BODY MASS INDEX: 26.63 KG/M2 | RESPIRATION RATE: 18 BRPM | WEIGHT: 169.69 LBS | HEART RATE: 64 BPM | HEIGHT: 67 IN | SYSTOLIC BLOOD PRESSURE: 134 MMHG | TEMPERATURE: 98 F | DIASTOLIC BLOOD PRESSURE: 70 MMHG

## 2023-03-22 DIAGNOSIS — E78.5 HYPERLIPIDEMIA, UNSPECIFIED HYPERLIPIDEMIA TYPE: Primary | ICD-10-CM

## 2023-03-22 PROCEDURE — 1159F MED LIST DOCD IN RCRD: CPT | Mod: CPTII,,, | Performed by: NURSE PRACTITIONER

## 2023-03-22 PROCEDURE — 3078F PR MOST RECENT DIASTOLIC BLOOD PRESSURE < 80 MM HG: ICD-10-PCS | Mod: CPTII,,, | Performed by: NURSE PRACTITIONER

## 2023-03-22 PROCEDURE — 99213 OFFICE O/P EST LOW 20 MIN: CPT | Mod: S$PBB,,, | Performed by: NURSE PRACTITIONER

## 2023-03-22 PROCEDURE — 3075F PR MOST RECENT SYSTOLIC BLOOD PRESS GE 130-139MM HG: ICD-10-PCS | Mod: CPTII,,, | Performed by: NURSE PRACTITIONER

## 2023-03-22 PROCEDURE — 3008F PR BODY MASS INDEX (BMI) DOCUMENTED: ICD-10-PCS | Mod: CPTII,,, | Performed by: NURSE PRACTITIONER

## 2023-03-22 PROCEDURE — 99213 PR OFFICE/OUTPT VISIT, EST, LEVL III, 20-29 MIN: ICD-10-PCS | Mod: S$PBB,,, | Performed by: NURSE PRACTITIONER

## 2023-03-22 PROCEDURE — 1159F PR MEDICATION LIST DOCUMENTED IN MEDICAL RECORD: ICD-10-PCS | Mod: CPTII,,, | Performed by: NURSE PRACTITIONER

## 2023-03-22 PROCEDURE — 3075F SYST BP GE 130 - 139MM HG: CPT | Mod: CPTII,,, | Performed by: NURSE PRACTITIONER

## 2023-03-22 PROCEDURE — 3008F BODY MASS INDEX DOCD: CPT | Mod: CPTII,,, | Performed by: NURSE PRACTITIONER

## 2023-03-22 PROCEDURE — 99213 OFFICE O/P EST LOW 20 MIN: CPT | Performed by: NURSE PRACTITIONER

## 2023-03-22 PROCEDURE — 3078F DIAST BP <80 MM HG: CPT | Mod: CPTII,,, | Performed by: NURSE PRACTITIONER

## 2023-03-22 RX ORDER — ATORVASTATIN CALCIUM 20 MG/1
20 TABLET, FILM COATED ORAL NIGHTLY
Qty: 90 TABLET | Refills: 1 | Status: SHIPPED | OUTPATIENT
Start: 2023-03-22 | End: 2023-09-18

## 2023-03-22 NOTE — PROGRESS NOTES
Patient ID: Darshan Segura is a 27 y.o. male.    Chief Complaint: Follow-up (28 yo here for general check up with no c/o. KM)    Mr. Segura presents today for follow up of symptoms and lab review. He states he is doing well at the time of this visit. He denies any other issues or complaints. His total cholesterol is elevated and labs date back to labs done 14 days show elevation so this is likely familial    Hyperlipidemia  This is a new problem. The current episode started more than 1 year ago. The problem is uncontrolled. Recent lipid tests were reviewed and are high. There are no known factors aggravating his hyperlipidemia. Pertinent negatives include no chest pain, focal sensory loss, focal weakness, leg pain, myalgias or shortness of breath. He is currently on no antihyperlipidemic treatment. The current treatment provides no improvement of lipids. There are no compliance problems.  Risk factors for coronary artery disease include family history and male sex.         No past medical history on file.  No past surgical history on file.      Tobacco History:  reports that he has been smoking cigarettes and vaping with nicotine. He has a 7.00 pack-year smoking history. He has never used smokeless tobacco.      Review of patient's allergies indicates:   Allergen Reactions    Ibuprofen        Current Outpatient Medications:     atorvastatin (LIPITOR) 20 MG tablet, Take 1 tablet (20 mg total) by mouth every evening., Disp: 90 tablet, Rfl: 1    ondansetron (ZOFRAN) 4 MG tablet, Take 1 tablet (4 mg total) by mouth every 8 (eight) hours as needed for Nausea., Disp: 15 tablet, Rfl: 0    Review of Systems   Constitutional:  Negative for activity change, appetite change, fatigue and fever.   HENT:  Negative for congestion, dental problem, nosebleeds, postnasal drip, rhinorrhea, sinus pain, sore throat and tinnitus.    Eyes:  Negative for pain, discharge, itching and visual disturbance.   Respiratory:  Negative for cough,  "chest tightness, shortness of breath and wheezing.    Cardiovascular:  Negative for chest pain.   Gastrointestinal:  Negative for abdominal pain, blood in stool, constipation, diarrhea, nausea and vomiting.   Endocrine: Negative for cold intolerance, heat intolerance, polydipsia, polyphagia and polyuria.   Genitourinary:  Negative for difficulty urinating, flank pain, genital sores, hematuria and urgency.   Musculoskeletal:  Negative for arthralgias and myalgias.   Skin:  Negative for rash and wound.   Allergic/Immunologic: Negative for environmental allergies and food allergies.   Neurological:  Negative for dizziness, focal weakness, light-headedness and headaches.   Hematological:  Negative for adenopathy. Does not bruise/bleed easily.   Psychiatric/Behavioral:  Negative for behavioral problems, confusion, decreased concentration, sleep disturbance and suicidal ideas. The patient is not nervous/anxious and is not hyperactive.         Objective:      Vitals:    03/22/23 0952   BP: 134/70   Pulse: 64   Resp: 18   Temp: 98 °F (36.7 °C)   TempSrc: Oral   Weight: 77 kg (169 lb 11.2 oz)   Height: 5' 7" (1.702 m)     Physical Exam  Vitals reviewed.   Constitutional:       General: He is not in acute distress.     Appearance: Normal appearance. He is normal weight. He is not ill-appearing, toxic-appearing or diaphoretic.   HENT:      Head: Normocephalic and atraumatic.      Right Ear: Tympanic membrane and external ear normal. There is no impacted cerumen.      Left Ear: Tympanic membrane and external ear normal. There is no impacted cerumen.      Nose: Nose normal. No congestion or rhinorrhea.      Mouth/Throat:      Mouth: Mucous membranes are moist.      Pharynx: Oropharynx is clear. No oropharyngeal exudate or posterior oropharyngeal erythema.   Eyes:      General: No scleral icterus.        Right eye: No discharge.         Left eye: No discharge.      Extraocular Movements: Extraocular movements intact.      " Conjunctiva/sclera: Conjunctivae normal.      Pupils: Pupils are equal, round, and reactive to light.   Cardiovascular:      Rate and Rhythm: Normal rate and regular rhythm.      Pulses: Normal pulses.      Heart sounds: Normal heart sounds. No murmur heard.    No friction rub. No gallop.   Pulmonary:      Effort: Pulmonary effort is normal. No respiratory distress.      Breath sounds: Normal breath sounds. No wheezing, rhonchi or rales.   Chest:      Chest wall: No tenderness.   Abdominal:      General: Abdomen is flat. Bowel sounds are normal.      Palpations: Abdomen is soft. There is no mass.      Tenderness: There is no abdominal tenderness. There is no guarding or rebound.   Musculoskeletal:         General: No swelling or signs of injury. Normal range of motion.      Cervical back: Normal range of motion and neck supple. No rigidity or tenderness.      Right lower leg: No edema.      Left lower leg: No edema.   Skin:     General: Skin is warm and dry.      Capillary Refill: Capillary refill takes less than 2 seconds.      Coloration: Skin is not pale.      Findings: No bruising or erythema.   Neurological:      General: No focal deficit present.      Mental Status: He is alert and oriented to person, place, and time. Mental status is at baseline.      Motor: No weakness.      Coordination: Coordination normal.      Gait: Gait normal.   Psychiatric:         Mood and Affect: Mood normal.         Behavior: Behavior normal.         Thought Content: Thought content normal.         Judgment: Judgment normal.         Assessment:       1. Hyperlipidemia, unspecified hyperlipidemia type           Plan:       Hyperlipidemia, unspecified hyperlipidemia type  -     atorvastatin (LIPITOR) 20 MG tablet; Take 1 tablet (20 mg total) by mouth every evening.  Dispense: 90 tablet; Refill: 1  -     Lipid Panel; Future; Expected date: 09/22/2023      Follow up in about 6 months (around 9/22/2023), or if symptoms worsen or fail to  improve, for Hyperlipidemia.        3/22/2023 Tiera Rosa, NP

## 2023-03-22 NOTE — LETTER
March 22, 2023      [facility] / Internal Medicine  [address] [address]  Phone: [phone]  Fax: [phone]       Patient: Darshan Segura   YOB: 1995  Date of Visit: 03/22/2023    To Whom It May Concern:    Laurie Segura  was at [facility] on 03/22/2023. The patient may return to work/school on 3/22/2023 with no restrictions. If you have any questions or concerns, or if I can be of further assistance, please do not hesitate to contact me.    Sincerely,        ERNESTINA Laboy

## 2023-06-14 ENCOUNTER — TELEPHONE (OUTPATIENT)
Dept: FAMILY MEDICINE | Facility: CLINIC | Age: 28
End: 2023-06-14

## 2023-06-14 NOTE — TELEPHONE ENCOUNTER
Atorvastatin 20 mg not covered by pt insurance. Per pharmacy, Covered alternate is: Rosuvastatin Tab or Simvastatin Tab.

## 2024-05-16 ENCOUNTER — HOSPITAL ENCOUNTER (EMERGENCY)
Facility: HOSPITAL | Age: 29
Discharge: HOME | End: 2024-05-16
Attending: EMERGENCY MEDICINE
Payer: MEDICAID

## 2024-05-16 VITALS
TEMPERATURE: 100 F | RESPIRATION RATE: 16 BRPM | SYSTOLIC BLOOD PRESSURE: 142 MMHG | BODY MASS INDEX: 22.96 KG/M2 | HEIGHT: 68 IN | WEIGHT: 151.5 LBS | OXYGEN SATURATION: 99 % | HEART RATE: 62 BPM | DIASTOLIC BLOOD PRESSURE: 88 MMHG

## 2024-05-16 DIAGNOSIS — N41.9 PROSTATITIS, UNSPECIFIED PROSTATITIS TYPE: Primary | ICD-10-CM

## 2024-05-16 LAB
ALBUMIN SERPL BCP-MCNC: 4.9 G/DL (ref 3.5–5.2)
ALP SERPL-CCNC: 69 U/L (ref 55–135)
ALT SERPL W/O P-5'-P-CCNC: 13 U/L (ref 10–44)
ANION GAP SERPL CALC-SCNC: 8 MMOL/L (ref 8–16)
AST SERPL-CCNC: 15 U/L (ref 10–40)
BASOPHILS # BLD AUTO: 0.01 K/UL (ref 0–0.2)
BASOPHILS NFR BLD: 0.1 % (ref 0–1.9)
BILIRUB SERPL-MCNC: 0.7 MG/DL (ref 0.1–1)
BILIRUB UR QL STRIP: NEGATIVE
BUN SERPL-MCNC: 14 MG/DL (ref 6–20)
CALCIUM SERPL-MCNC: 9.7 MG/DL (ref 8.7–10.5)
CHLORIDE SERPL-SCNC: 104 MMOL/L (ref 95–110)
CLARITY UR: CLEAR
CO2 SERPL-SCNC: 26 MMOL/L (ref 23–29)
COLOR UR: COLORLESS
CREAT SERPL-MCNC: 1.3 MG/DL (ref 0.5–1.4)
DIFFERENTIAL METHOD BLD: ABNORMAL
EOSINOPHIL # BLD AUTO: 0 K/UL (ref 0–0.5)
EOSINOPHIL NFR BLD: 0.4 % (ref 0–8)
ERYTHROCYTE [DISTWIDTH] IN BLOOD BY AUTOMATED COUNT: 15.9 % (ref 11.5–14.5)
EST. GFR  (NO RACE VARIABLE): >60 ML/MIN/1.73 M^2
GLUCOSE SERPL-MCNC: 78 MG/DL (ref 70–110)
GLUCOSE UR QL STRIP: NEGATIVE
HCT VFR BLD AUTO: 43.5 % (ref 40–54)
HGB BLD-MCNC: 13.3 G/DL (ref 14–18)
HGB UR QL STRIP: NEGATIVE
IMM GRANULOCYTES # BLD AUTO: 0.02 K/UL (ref 0–0.04)
IMM GRANULOCYTES NFR BLD AUTO: 0.3 % (ref 0–0.5)
KETONES UR QL STRIP: NEGATIVE
LEUKOCYTE ESTERASE UR QL STRIP: NEGATIVE
LYMPHOCYTES # BLD AUTO: 2.4 K/UL (ref 1–4.8)
LYMPHOCYTES NFR BLD: 31.9 % (ref 18–48)
MCH RBC QN AUTO: 22.3 PG (ref 27–31)
MCHC RBC AUTO-ENTMCNC: 30.6 G/DL (ref 32–36)
MCV RBC AUTO: 73 FL (ref 82–98)
MONOCYTES # BLD AUTO: 0.4 K/UL (ref 0.3–1)
MONOCYTES NFR BLD: 5.6 % (ref 4–15)
NEUTROPHILS # BLD AUTO: 4.7 K/UL (ref 1.8–7.7)
NEUTROPHILS NFR BLD: 61.7 % (ref 38–73)
NITRITE UR QL STRIP: NEGATIVE
NRBC BLD-RTO: 0 /100 WBC
PH UR STRIP: 6 [PH] (ref 5–8)
PLATELET # BLD AUTO: 205 K/UL (ref 150–450)
PMV BLD AUTO: 10.3 FL (ref 9.2–12.9)
POTASSIUM SERPL-SCNC: 3.8 MMOL/L (ref 3.5–5.1)
PROT SERPL-MCNC: 7.5 G/DL (ref 6–8.4)
PROT UR QL STRIP: NEGATIVE
RBC # BLD AUTO: 5.97 M/UL (ref 4.6–6.2)
SODIUM SERPL-SCNC: 138 MMOL/L (ref 136–145)
SP GR UR STRIP: 1.01 (ref 1–1.03)
URN SPEC COLLECT METH UR: ABNORMAL
UROBILINOGEN UR STRIP-ACNC: NEGATIVE EU/DL
WBC # BLD AUTO: 7.64 K/UL (ref 3.9–12.7)

## 2024-05-16 PROCEDURE — 85025 COMPLETE CBC W/AUTO DIFF WBC: CPT | Performed by: NURSE PRACTITIONER

## 2024-05-16 PROCEDURE — 96365 THER/PROPH/DIAG IV INF INIT: CPT | Mod: 59

## 2024-05-16 PROCEDURE — 81003 URINALYSIS AUTO W/O SCOPE: CPT | Performed by: NURSE PRACTITIONER

## 2024-05-16 PROCEDURE — 63600175 PHARM REV CODE 636 W HCPCS: Performed by: EMERGENCY MEDICINE

## 2024-05-16 PROCEDURE — 80053 COMPREHEN METABOLIC PANEL: CPT | Performed by: NURSE PRACTITIONER

## 2024-05-16 PROCEDURE — 96361 HYDRATE IV INFUSION ADD-ON: CPT

## 2024-05-16 PROCEDURE — 25000003 PHARM REV CODE 250: Performed by: EMERGENCY MEDICINE

## 2024-05-16 PROCEDURE — 25500020 PHARM REV CODE 255: Performed by: EMERGENCY MEDICINE

## 2024-05-16 PROCEDURE — 99285 EMERGENCY DEPT VISIT HI MDM: CPT | Mod: 25

## 2024-05-16 RX ORDER — ONDANSETRON 4 MG/1
4 TABLET, ORALLY DISINTEGRATING ORAL EVERY 6 HOURS PRN
Qty: 15 TABLET | Refills: 0 | Status: SHIPPED | OUTPATIENT
Start: 2024-05-16

## 2024-05-16 RX ORDER — DOXYCYCLINE 100 MG/1
100 CAPSULE ORAL 2 TIMES DAILY
Qty: 28 CAPSULE | Refills: 0 | Status: SHIPPED | OUTPATIENT
Start: 2024-05-16 | End: 2024-05-30

## 2024-05-16 RX ADMIN — CEFTRIAXONE SODIUM 1 G: 1 INJECTION, POWDER, FOR SOLUTION INTRAMUSCULAR; INTRAVENOUS at 10:05

## 2024-05-16 RX ADMIN — IOHEXOL 100 ML: 350 INJECTION, SOLUTION INTRAVENOUS at 08:05

## 2024-05-16 RX ADMIN — SODIUM CHLORIDE 1000 ML: 9 INJECTION, SOLUTION INTRAVENOUS at 07:05

## 2024-05-16 NOTE — Clinical Note
"Darshan Segura (Kavon) was seen and treated in our emergency department on 5/16/2024.  He may return to work on 05/18/2024.       If you have any questions or concerns, please don't hesitate to call.      Gaye Rodriguez"

## 2024-05-16 NOTE — FIRST PROVIDER EVALUATION
Emergency Department TeleTriage Encounter Note      CHIEF COMPLAINT    Chief Complaint   Patient presents with    Abdominal Pain     Abdominal pain x 1 month - patient notes lower left abdomen and patient states that he has groin pain in the morning    Groin Pain       VITAL SIGNS   Initial Vitals [05/16/24 1457]   BP Pulse Resp Temp SpO2   (!) 140/83 69 17 99.5 °F (37.5 °C) 98 %      MAP       --            ALLERGIES    Review of patient's allergies indicates:   Allergen Reactions    Ibuprofen        PROVIDER TRIAGE NOTE  Verbal consent for the teletriage evaluation was given by the patient at the start of the evaluation.  All efforts will be made to maintain patient's privacy during the evaluation.      This is a teletriage evaluation of a 28 y.o. male presenting to the ED with c/o lower abdominal pain that started 1.5 months ago.  Had kidney stones. Limited physical exam via telehealth: The patient is awake, alert, answering questions appropriately and is not in respiratory distress.  As the Teletriage provider, I performed an initial assessment and ordered appropriate labs and imaging studies, if any, to facilitate the patient's care once placed in the ED. Once a room is available, care and a full evaluation will be completed by an alternate ED provider.  Any additional orders and the final disposition will be determined by that provider.  All imaging and labs will not be followed-up by the Teletriage Team, including myself.        ORDERS  Labs Reviewed - No data to display    ED Orders (720h ago, onward)      None              Virtual Visit Note: The provider triage portion of this emergency department evaluation and documentation was performed via [employer], a HIPAA-compliant telemedicine application, in concert with a tele-presenter in the room. A face to face patient evaluation with one of my colleagues will occur once the patient is placed in an emergency department room.      DISCLAIMER: This note was  prepared with [employer] voice recognition transcription software. Garbled syntax, mangled pronouns, and other bizarre constructions may be attributed to that software system.

## 2024-05-16 NOTE — Clinical Note
"Darshan WALTON"Mehdi Segura was seen and treated in our emergency department on 5/16/2024.  He may return to work on 05/20/2024.       If you have any questions or concerns, please don't hesitate to call.      Sheeba Akins MD"

## 2024-05-17 ENCOUNTER — TELEPHONE (OUTPATIENT)
Dept: UROLOGY | Facility: CLINIC | Age: 29
End: 2024-05-17
Payer: MEDICAID

## 2024-05-17 NOTE — TELEPHONE ENCOUNTER
----- Message from Adry Crowell sent at 5/17/2024  9:11 AM CDT -----  Type:  Sooner Appointment Request    Caller is requesting a sooner appointment.  Caller declined first available appointment listed below.  Caller will not accept being placed on the waitlist and is requesting a message be sent to doctor.    Name of Caller:  pt Yadira raphael  When is the first available appointment?  none  Symptoms:  Hospital f/u enlarged prostate  Would the patient rather a call back or a response via [facility]? call  Best Call Back Number: [phone]    Additional Information:  thank you

## 2024-05-17 NOTE — ED PROVIDER NOTES
[address] [place] [address] AND SPORTS MEDICINE  Critical access hospital [facility]  [address] [address]  Dept: [phone]    Ambulatory Orthopedic Consult      CHIEF COMPLAINT:    Chief Complaint   Patient presents with    Knee Pain     Right       HISTORY OF PRESENT ILLNESS:      The patient is a 48 y.o. female who is being seen for evaluation of the above, which began around 5/1/2023 atraumatically  . At today's visit, she is using no brace/assistive device. History is obtained today from:   [x]  the patient     [x]  EMR     []  one family member/friend    []  multiple family members/friends    []  other: At today's visit, she localizes her pain to the right medial knee. She does report that she also had a twisting injury on 5/30/2023 when she stepped wrong. She denies any painful catching/clicking. REVIEW OF SYSTEMS:  Musculoskeletal: See HPI for pertinent positives     Past Medical History:    She  has a past medical history of Acid reflux, Anxiety, Diabetes mellitus ([address] Hyperlipidemia, Hypertension, Migraine, and Tachycardia. Past Surgical History:    She  has a past surgical history that includes back surgery.      Current Medications:     Current Outpatient Medications:     Semaglutide, 2 MG/DOSE, 8 MG/3ML SOPN, Inject 2 mg into the skin every 7 days, Disp: , Rfl:     rOPINIRole (REQUIP XL) 2 MG extended release tablet, Take 1 tablet by mouth in the morning and 1 tablet in the evening., Disp: , Rfl:     insulin regular human (HUMULIN R U-500 KWIKPEN) 500 UNIT/ML SOPN concentrated injection pen, 100 units twice daily, Disp: , Rfl:     gabapentin (NEURONTIN) 300 MG capsule, TAKE 1 CAPSULE BY MOUTH AT BEDTIME, MUST LAST 30 DAYS, Disp: , Rfl:     fluticasone-umeclidin-vilant (TRELEGY ELLIPTA) 100-62.5-25 MCG/ACT AEPB inhaler, Inhale 1 puff into the lungs daily, Disp: , Rfl:     desvenlafaxine succinate (PRISTIQ) 50 MG TB24 extended Encounter Date: 5/16/2024       History     Chief Complaint   Patient presents with    Abdominal Pain     Abdominal pain x 1 month - patient notes lower left abdomen and patient states that he has groin pain in the morning    Groin Pain     Patient with PMH HLD presents to ED with intermittent left lower quadrant abdominal pain radiating into his groin that has been present for about a month and a half.  He states he also had this a few years ago and was told that he may have kidney stones, although he had no formal imaging.  He states he had some left testicular pain about a month ago but this self-resolved.  Denies any scrotal swelling or known hernia.  He denies any penile discharge or dysuria.  He does report that it is painful when he ejaculates.  He denies any rash.  Denies fever.  He has had some nausea but no vomiting.  States he has fluctuating constipation and diarrhea.  Denies any family history of inflammatory bowel disease.  Denies any trauma.  States the pain intensified in the last couple of days prompting his mother to bring him to the emergency room for evaluation.      Review of patient's allergies indicates:   Allergen Reactions    Ibuprofen      No past medical history on file.  No past surgical history on file.  Family History   Problem Relation Name Age of Onset    Hyperlipidemia Mother      Hyperlipidemia Father      Hyperlipidemia Brother      Hyperlipidemia Brother      Hyperlipidemia Brother       Social History     Tobacco Use    Smoking status: Every Day     Current packs/day: 1.00     Average packs/day: 1 pack/day for 7.0 years (7.0 ttl pk-yrs)     Types: Cigarettes, Vaping with nicotine    Smokeless tobacco: Never   Substance Use Topics    Alcohol use: Yes    Drug use: Not Currently     Review of Systems   Constitutional:  Negative for fever.   HENT:  Negative for sore throat.    Respiratory:  Negative for shortness of breath.    Cardiovascular:  Negative for chest pain.    Gastrointestinal:  Positive for abdominal pain and nausea. Negative for vomiting.   Genitourinary:  Negative for dysuria and penile discharge.   Musculoskeletal:  Negative for back pain.   Skin:  Negative for rash.   Neurological:  Negative for weakness.   Hematological:  Does not bruise/bleed easily.       Physical Exam     Initial Vitals [05/16/24 1457]   BP Pulse Resp Temp SpO2   (!) 140/83 69 17 99.5 °F (37.5 °C) 98 %      MAP       --         Physical Exam    Nursing note and vitals reviewed.  Constitutional: He appears well-developed and well-nourished. No distress.   HENT:   Head: Normocephalic and atraumatic.   Nose: Nose normal.   Eyes: Conjunctivae and EOM are normal. Pupils are equal, round, and reactive to light.   Neck: Neck supple.   Normal range of motion.  Cardiovascular:  Normal rate and regular rhythm.           Pulmonary/Chest: Breath sounds normal. No respiratory distress.   Abdominal: Abdomen is soft. There is abdominal tenderness (mild LLQ). Hernia confirmed negative in the right inguinal area and confirmed negative in the left inguinal area. There is no rebound and no guarding.   Genitourinary:    Testes and penis normal.   Cremasteric reflex is present. No discharge found.   Musculoskeletal:         General: No tenderness or edema. Normal range of motion.      Cervical back: Normal range of motion and neck supple.     Neurological: He is alert and oriented to person, place, and time. He has normal strength. No cranial nerve deficit. GCS score is 15. GCS eye subscore is 4. GCS verbal subscore is 5. GCS motor subscore is 6.   Skin: Skin is warm and dry. Capillary refill takes less than 2 seconds. No rash noted.   Psychiatric: He has a normal mood and affect. Thought content normal.         ED Course   Procedures  Labs Reviewed   CBC W/ AUTO DIFFERENTIAL - Abnormal; Notable for the following components:       Result Value    Hemoglobin 13.3 (*)     MCV 73 (*)     MCH 22.3 (*)     MCHC 30.6 (*)      RDW 15.9 (*)     All other components within normal limits   URINALYSIS, REFLEX TO URINE CULTURE - Abnormal; Notable for the following components:    Color, UA Colorless (*)     All other components within normal limits    Narrative:     Specimen Source->Urine   COMPREHENSIVE METABOLIC PANEL   C. TRACHOMATIS/N. GONORRHOEAE BY AMP DNA          Imaging Results               CT Abdomen Pelvis With IV Contrast NO Oral Contrast (Final result)  Result time 05/16/24 21:04:18      Final result by Quinton Cobb MD (05/16/24 21:04:18)                   Impression:      Enlarged prostate measuring up to 5.4 cm with small volume of free fluid in the pelvis.  No discrete abscess appreciated, although prostatitis remains within the differential consideration.  Correlation with PSA and appropriate lab values advised.  Urologic follow-up recommended.    Mild circumferential bladder wall thickening, possibly relating to chronic outlet obstruction given enlarged prostate, although infectious process not excluded.  Soft tissue density at the base of the bladder lumen appears to relate to enlarged prostate tissue.  Correlation with urinalysis advised.    Mild apparent rectal wall thickening which may relate to nondistention, although a nonspecific proctitis is not excluded.  Clinical correlation is advised.    This report was flagged in Epic as abnormal.      Electronically signed by: Quinton Cobb MD  Date:    05/16/2024  Time:    21:04               Narrative:    EXAMINATION:  CT ABDOMEN PELVIS WITH IV CONTRAST    CLINICAL HISTORY:  LLQ abdominal pain;    TECHNIQUE:  Low dose axial images, sagittal and coronal reformations were obtained from the lung bases to the pubic symphysis following the IV administration of 100 mL of Omnipaque 350 .  Oral contrast was not given.    COMPARISON:  None.    FINDINGS:  The lung bases are unremarkable.  There is no pleural fluid present.  The visualized portions of the heart appear  normal.    The liver is normal in size and attenuation with no focal hepatic abnormality.  The gallbladder shows no evidence of stones or pericholecystic fluid.  There is no intra-or extrahepatic biliary ductal dilatation.    The stomach, spleen, pancreas, and adrenal glands are unremarkable.    The kidneys are normal in size and location and enhance symmetrically.  There is no evidence of hydronephrosis. Urinary bladder demonstrates circumferential wall thickening.  Prostate is enlarged measuring up to 5.4 x 4.4 cm in transaxial dimension.  Soft tissue density at the base of the bladder lumen appears to relate to enlarged prostate tissue.  There is small volume of free fluid within the pelvis.    The abdominal aorta is normal in course and caliber without significant atherosclerotic calcifications.    There is no evidence of small-bowel obstruction.  There is mild apparent rectal wall thickening may relate to nondistention, although a nonspecific proctitis not excluded.  There is no CT evidence of acute appendicitis. There is no free intraperitoneal air or portal venous gas.  There are scattered shotty small mesenteric and retroperitoneal lymph nodes.    The visualized skeletal structures appear intact.  The extraperitoneal soft tissues are unremarkable.                                       Medications   sodium chloride 0.9% bolus 1,000 mL 1,000 mL (0 mLs Intravenous Stopped 5/16/24 2132)   iohexoL (OMNIPAQUE 350) injection 100 mL (100 mLs Intravenous Given 5/16/24 2036)   cefTRIAXone (ROCEPHIN) 1 g in dextrose 5 % 100 mL IVPB (ready to mix) (0 g Intravenous Stopped 5/16/24 2224)     Medical Decision Making  Patient presents to ED as above.  Vitals were stable.  PMH as noted above.  Differential includes but not limited to kidney stone, hernia, diverticulitis, inflammatory bowel disease, UTI, pyelonephritis.  All labs reviewed by me and are fairly unremarkable.  Given duration of symptoms, CT abdomen/pelvis  obtained for further evaluation and independently reviewed by me.  Per radiology read, there are findings suggestive of prostatitis.  I discussed this with patient.  He denies any rectal intercourse.  States he is sexually active with women only.  He did have negative HIV testing with the last 6 months.  Will send gonorrhea and chlamydia cultures tonight.  Will place him on 2 week course of doxycycline and given a dose of Rocephin IV here.  I have advised to follow up with his PCP within the week for re-evaluation and also recommend urology referral.  ED return precautions discussed and provided.    Amount and/or Complexity of Data Reviewed  External Data Reviewed: labs and notes.  Labs: ordered. Decision-making details documented in ED Course.  Radiology: ordered and independent interpretation performed. Decision-making details documented in ED Course.    Risk  Prescription drug management.                                      Clinical Impression:  Final diagnoses:  [N41.9] Prostatitis, unspecified prostatitis type (Primary)          ED Disposition Condition    Discharge Stable          ED Prescriptions       Medication Sig Dispense Start Date End Date Auth. Provider    doxycycline (VIBRAMYCIN) 100 MG Cap Take 1 capsule (100 mg total) by mouth 2 (two) times daily. for 14 days 28 capsule 5/16/2024 5/30/2024 Sheeba Akins MD    ondansetron (ZOFRAN-ODT) 4 MG TbDL Take 1 tablet (4 mg total) by mouth every 6 (six) hours as needed (nausea/vomiting). 15 tablet 5/16/2024 -- Sheeba Akins MD          Follow-up Information       Follow up With Specialties Details Why Contact Info Additional Information    [facility] - Emergency Dept Emergency Medicine  As needed, If symptoms worsen [address] [facility] [address] [phone] 1st floor    Dakota Fortune MD Urology Schedule an appointment as soon as possible for a visit   [address] [place] [address] [address]  [facility]  [address]  [phone]       Your Primary Care Physician    Keep upcoming appointment as scheduled              Sheeba Akins MD  05/17/24 9256

## 2024-05-22 DIAGNOSIS — N41.9 PROSTATITIS, UNSPECIFIED PROSTATITIS TYPE: ICD-10-CM

## 2024-05-22 DIAGNOSIS — E78.5 HYPERLIPIDEMIA, UNSPECIFIED HYPERLIPIDEMIA TYPE: Primary | ICD-10-CM

## 2024-05-27 NOTE — PROGRESS NOTES
Subjective:       Darshan Segura is a 28 y.o. male who is a new patient who was referred by Dr. Checo Quiroz  for evaluation of prostatitis.      Reports LLQ and groin pain x 1 mth that prompted ER visit 5/16/24. L testicular pain prior to that which self-resolved. CT in ER showed enlarged prostate (5.4cm transaxial width, no formal size calculated) without discrete abscess, concern for prostatitis. Given Rocephin x 1 and doxy x 2 weeks. UA was clear, UCx was not sent. Abx finished. Denies fevers. Reports occasional cold sweats.     He feels cramping sensation in LLQ and L groin L lower back. Sometimes with difficulty voiding (hesitancy), passing stool.  Denies dysuria unless he strains to void. Notes painful ejaculation.     PVR (bladder scan) today - 45cc      The following portions of the patient's history were reviewed and updated as appropriate: allergies, current medications, past family history, past medical history, past social history, past surgical history and problem list.    Review of Systems  Twelve point review of systems completed. Pertinent positive and negatives listed in HPI.      Objective:    Vitals: Wt 68.9 kg (151 lb 12.9 oz)   BMI 23.08 kg/m²     Physical Exam   General: well developed, well nourished in no acute distress  Head: normocephalic, atraumatic  Neck: no obvious enlargement of thyroid  HEENT: EOMI, mucus membranes moist, sclera anicteric, no hearing impairment  Lungs: symmetric expansion, non-labored breathing  Neuro: alert and oriented x 3, no gross deficits  Psych: normal judgment and insight, normal mood/affect and non-anxious  Genitourinary:   Penis -  circumcised penis without plaques, lesions, or scarring.  Urethra - orthotopic location without stenosis.  Scrotum  - no lesions or rashes, no hydrocele or hernia.  Testes - descended bilaterally, symmetric without masses, non tender.  Epididymides - no cysts or lesions, no spermatocele, symmetric. LAD noted in R inguinal, not on  "L. No cutaneous lesion. No perineal tenderness.   BRITTNEY: deferred      Lab Review   Urine analysis today in clinic shows +trace protein     Lab Results   Component Value Date    WBC 7.64 05/16/2024    HGB 13.3 (L) 05/16/2024    HCT 43.5 05/16/2024    MCV 73 (L) 05/16/2024     05/16/2024     Lab Results   Component Value Date    CREATININE 1.3 05/16/2024    BUN 14 05/16/2024     No results found for: "PSA"  No results found for: "PSADIAG"    Imaging  CT reviewed  Reports and images were personally reviewed by me and discussed with the patient today         Assessment/Plan:      1. Enlarged prostate    - Prostate noted to be larger than average on CT scan. Also with intermittent obstructive symptoms. Unsure if related to prostatitis. UA clear.    - Will trial Flomax. Discussed SE.    - PVR low      2. Other prostatic inflammatory diseases    - UCx, atypicals, GC/CT. Recent treatment with doxycyline that could make for false negative.     3. LLQ pain    - Trial Flomax   - Kidneys otherwise normal on CT    - UA clear   - Discussed PFPT for pain, declines for now       Follow up in 2-3 months         "

## 2024-06-03 ENCOUNTER — TELEPHONE (OUTPATIENT)
Dept: UROLOGY | Facility: CLINIC | Age: 29
End: 2024-06-03
Payer: MEDICAID

## 2024-06-03 ENCOUNTER — OFFICE VISIT (OUTPATIENT)
Dept: UROLOGY | Facility: CLINIC | Age: 29
End: 2024-06-03
Payer: MEDICAID

## 2024-06-03 VITALS — BODY MASS INDEX: 23.08 KG/M2 | WEIGHT: 151.81 LBS

## 2024-06-03 DIAGNOSIS — R10.32 LLQ PAIN: ICD-10-CM

## 2024-06-03 DIAGNOSIS — N40.0 ENLARGED PROSTATE: Primary | ICD-10-CM

## 2024-06-03 DIAGNOSIS — N41.8 OTHER PROSTATIC INFLAMMATORY DISEASES: ICD-10-CM

## 2024-06-03 PROCEDURE — 87563 M. GENITALIUM AMP PROBE: CPT | Performed by: UROLOGY

## 2024-06-03 PROCEDURE — 1159F MED LIST DOCD IN RCRD: CPT | Mod: CPTII,,, | Performed by: UROLOGY

## 2024-06-03 PROCEDURE — 1160F RVW MEDS BY RX/DR IN RCRD: CPT | Mod: CPTII,,, | Performed by: UROLOGY

## 2024-06-03 PROCEDURE — 99999 PR PBB SHADOW E&M-EST. PATIENT-LVL III: CPT | Mod: PBBFAC,,, | Performed by: UROLOGY

## 2024-06-03 PROCEDURE — 99213 OFFICE O/P EST LOW 20 MIN: CPT | Mod: PBBFAC | Performed by: UROLOGY

## 2024-06-03 PROCEDURE — 3008F BODY MASS INDEX DOCD: CPT | Mod: CPTII,,, | Performed by: UROLOGY

## 2024-06-03 PROCEDURE — 87086 URINE CULTURE/COLONY COUNT: CPT | Performed by: UROLOGY

## 2024-06-03 PROCEDURE — 87798 DETECT AGENT NOS DNA AMP: CPT | Performed by: UROLOGY

## 2024-06-03 PROCEDURE — 87591 N.GONORRHOEAE DNA AMP PROB: CPT | Performed by: UROLOGY

## 2024-06-03 PROCEDURE — 99204 OFFICE O/P NEW MOD 45 MIN: CPT | Mod: S$PBB,,, | Performed by: UROLOGY

## 2024-06-03 PROCEDURE — 87491 CHLMYD TRACH DNA AMP PROBE: CPT | Performed by: UROLOGY

## 2024-06-03 RX ORDER — TAMSULOSIN HYDROCHLORIDE 0.4 MG/1
0.4 CAPSULE ORAL DAILY
Qty: 30 CAPSULE | Refills: 2 | Status: SHIPPED | OUTPATIENT
Start: 2024-06-03 | End: 2025-05-29

## 2024-06-05 LAB
BACTERIA UR CULT: NO GROWTH
C TRACH DNA SPEC QL NAA+PROBE: NOT DETECTED
M GENITALIUM DNA UR QL NAA+PROBE: NEGATIVE
N GONORRHOEA DNA SPEC QL NAA+PROBE: NOT DETECTED
SPECIMEN SOURCE: NORMAL

## 2024-06-07 LAB
SPECIMEN SOURCE: NORMAL
U PARVUM DNA SPEC QL NAA+PROBE: NEGATIVE
U UREALYTICUM DNA SPEC QL NAA+PROBE: NEGATIVE

## 2025-07-27 ENCOUNTER — HOSPITAL ENCOUNTER (EMERGENCY)
Facility: HOSPITAL | Age: 30
Discharge: HOME | End: 2025-07-27
Attending: EMERGENCY MEDICINE
Payer: MEDICAID

## 2025-07-27 VITALS
OXYGEN SATURATION: 100 % | TEMPERATURE: 98 F | SYSTOLIC BLOOD PRESSURE: 129 MMHG | HEIGHT: 67 IN | WEIGHT: 160 LBS | BODY MASS INDEX: 25.11 KG/M2 | RESPIRATION RATE: 18 BRPM | HEART RATE: 72 BPM | DIASTOLIC BLOOD PRESSURE: 76 MMHG

## 2025-07-27 DIAGNOSIS — M54.42 ACUTE BILATERAL LOW BACK PAIN WITH LEFT-SIDED SCIATICA: ICD-10-CM

## 2025-07-27 DIAGNOSIS — M54.32 SCIATICA OF LEFT SIDE: Primary | ICD-10-CM

## 2025-07-27 LAB
BILIRUB UR QL STRIP.AUTO: NEGATIVE
CLARITY UR: CLEAR
COLOR UR AUTO: YELLOW
GLUCOSE UR QL STRIP: NEGATIVE
HCV AB SERPL QL IA: NORMAL
HGB UR QL STRIP: NEGATIVE
HIV 1+2 AB+HIV1 P24 AG SERPL QL IA: NORMAL
KETONES UR QL STRIP: NEGATIVE
LEUKOCYTE ESTERASE UR QL STRIP: NEGATIVE
NITRITE UR QL STRIP: NEGATIVE
PH UR STRIP: 7 [PH]
PROT UR QL STRIP: ABNORMAL
SP GR UR STRIP: >=1.03
UROBILINOGEN UR STRIP-ACNC: NEGATIVE EU/DL

## 2025-07-27 PROCEDURE — 86803 HEPATITIS C AB TEST: CPT | Performed by: EMERGENCY MEDICINE

## 2025-07-27 PROCEDURE — 25000003 PHARM REV CODE 250

## 2025-07-27 PROCEDURE — 81003 URINALYSIS AUTO W/O SCOPE: CPT

## 2025-07-27 PROCEDURE — 99284 EMERGENCY DEPT VISIT MOD MDM: CPT | Mod: 25

## 2025-07-27 PROCEDURE — 87389 HIV-1 AG W/HIV-1&-2 AB AG IA: CPT | Performed by: EMERGENCY MEDICINE

## 2025-07-27 PROCEDURE — 36415 COLL VENOUS BLD VENIPUNCTURE: CPT | Performed by: EMERGENCY MEDICINE

## 2025-07-27 RX ORDER — LIDOCAINE 50 MG/G
1 PATCH TOPICAL DAILY
Qty: 5 PATCH | Refills: 0 | Status: SHIPPED | OUTPATIENT
Start: 2025-07-27 | End: 2025-08-01

## 2025-07-27 RX ORDER — METHOCARBAMOL 500 MG/1
1000 TABLET, FILM COATED ORAL 3 TIMES DAILY
Qty: 15 TABLET | Refills: 0 | Status: SHIPPED | OUTPATIENT
Start: 2025-07-27 | End: 2025-07-30

## 2025-07-27 RX ORDER — LIDOCAINE 50 MG/G
1 PATCH TOPICAL
Status: DISCONTINUED | OUTPATIENT
Start: 2025-07-27 | End: 2025-07-27 | Stop reason: HOSPADM

## 2025-07-27 RX ORDER — PREDNISONE 20 MG/1
20 TABLET ORAL DAILY
Qty: 4 TABLET | Refills: 0 | Status: SHIPPED | OUTPATIENT
Start: 2025-07-27 | End: 2025-07-31

## 2025-07-27 RX ORDER — HYDROCODONE BITARTRATE AND ACETAMINOPHEN 5; 325 MG/1; MG/1
1 TABLET ORAL
Refills: 0 | Status: COMPLETED | OUTPATIENT
Start: 2025-07-27 | End: 2025-07-27

## 2025-07-27 RX ORDER — METHOCARBAMOL 500 MG/1
1000 TABLET, FILM COATED ORAL
Status: COMPLETED | OUTPATIENT
Start: 2025-07-27 | End: 2025-07-27

## 2025-07-27 RX ADMIN — HYDROCODONE BITARTRATE AND ACETAMINOPHEN 1 TABLET: 5; 325 TABLET ORAL at 11:07

## 2025-07-27 RX ADMIN — METHOCARBAMOL 1000 MG: 500 TABLET ORAL at 11:07

## 2025-07-27 RX ADMIN — LIDOCAINE 1 PATCH: 50 PATCH TOPICAL at 12:07

## 2025-07-27 NOTE — DISCHARGE INSTRUCTIONS
Please take Robaxin as needed for discomfort.  Do not drink or drive while taking this medication.  You may also take it with Tylenol and Motrin.  Prednisone is a steroid which is used to help reduce inflammation.  Lidocaine patch can be used for 12 hours but you can only use 1 in a 24 hour period.  If you develop persistent numbness or tingling, numbness or tingling of the groin region, bowel or bladder dysfunction, fevers, night sweats, chills, worsening back pain please return to the emergency department for re-evaluation.

## 2025-07-27 NOTE — Clinical Note
"Darshan Campbell"Colton was seen and treated in our emergency department on 7/27/2025.  He may return to work on 07/29/2025.       If you have any questions or concerns, please don't hesitate to call.      Deepika Navarrete, ISI"

## 2025-07-27 NOTE — ED PROVIDER NOTES
Encounter Date: 7/27/2025       History     Chief Complaint   Patient presents with    Back Pain     X 1week     29-year-old male past medical history of enlarged prostate presents to the emergency department for low back pain x1 week.  Patient states that he usually lifts heavy objects at work that began to feel low back pain that occasionally goes into the but in the left leg.  States it is a dull aching pain that is constant but the pain in his leg is intermittent.  He denies bowel or bladder changes, numbness or tingling of the groin region, fevers, IV drug use, any trauma, urinary symptoms.        Review of patient's allergies indicates:   Allergen Reactions    Tomato Anaphylaxis    Ibuprofen      Past Medical History:   Diagnosis Date    Mixed hyperlipidemia      No past surgical history on file.  Family History   Problem Relation Name Age of Onset    Hyperlipidemia Mother      Hyperlipidemia Father      Hyperlipidemia Brother      Hyperlipidemia Brother      Hyperlipidemia Brother       Social History[1]  Review of Systems   Constitutional: Negative.    Respiratory: Negative.     Cardiovascular: Negative.    Gastrointestinal: Negative.    Genitourinary: Negative.    Musculoskeletal:  Positive for back pain.   Neurological: Negative.        Physical Exam     Initial Vitals [07/27/25 1017]   BP Pulse Resp Temp SpO2   130/80 70 16 98.6 °F (37 °C) 100 %      MAP       --         Physical Exam    Vitals reviewed.  Constitutional: He appears well-developed and well-nourished. He is not diaphoretic. No distress.   HENT: Mouth/Throat: Oropharynx is clear and moist.   Eyes: Conjunctivae and EOM are normal. Right eye exhibits no discharge. Left eye exhibits no discharge.   Neck: Neck supple.   Normal range of motion.  Cardiovascular:  Normal rate, regular rhythm, normal heart sounds and intact distal pulses.           Pulmonary/Chest: Breath sounds normal. No respiratory distress.   Abdominal: Abdomen is soft. He  exhibits no distension.   Musculoskeletal:         General: Normal range of motion.      Cervical back: Normal range of motion and neck supple.        Back:       Comments: No isolated midline tenderness or paraspinal tenderness.  Patient does have bilateral lower back tenderness in the musculoskeletal region of the lumbar spine.  Pain isn't affected by twisting or flexion of the spine.  No CVA tenderness.  Negative straight leg raise.  5/5 strength in bilateral upper and lower extremities     Neurological: He is alert and oriented to person, place, and time. He has normal strength. No cranial nerve deficit or sensory deficit. GCS score is 15. GCS eye subscore is 4. GCS verbal subscore is 5. GCS motor subscore is 6.   No sensory deficits in the bilateral upper or lower extremities.  Proprioception intact.  Normal gait   Skin: Skin is warm. Capillary refill takes less than 2 seconds.         ED Course   Procedures  Labs Reviewed   URINALYSIS, REFLEX TO URINE CULTURE - Abnormal       Result Value    Color, UA Yellow      Appearance, UA Clear      Spec Grav UA >=1.030 (*)     pH, UA 7.0      Protein, UA Trace (*)     Glucose, UA Negative      Ketones, UA Negative      Blood, UA Negative      Bilirubin, UA Negative      Urobilinogen, UA Negative      Nitrites, UA Negative      Leukocyte Esterase, UA Negative     HEPATITIS C ANTIBODY   HEP C VIRUS HOLD SPECIMEN   HIV 1 / 2 ANTIBODY   HIV VIRUS CONFIRMATION HOLD SPECIMEN          Imaging Results              X-Ray Lumbar Spine 5 View (Final result)  Result time 07/27/25 11:51:16      Final result by Kwaku Jacobs MD (07/27/25 11:51:16)                   Impression:      No acute osseous abnormality is seen in the lumbar spine.      Electronically signed by: Kwaku Jacobs  Date:    07/27/2025  Time:    11:51               Narrative:    CLINICAL HISTORY:  ([id number])28 y/o  (1995) M    Lower back pain/radiculopathy down the left  leg.    TECHNIQUE:  (A#[id number], exam time 7/27/2025 11:47)    XR LUMBAR SPINE COMPLETE 5 VIEW IMG69    Five view(s) obtained.    COMPARISON:  None.    FINDINGS:  There are 5 non rib bearing lumbar vertebral segments. There is no acute fracture, or significant listhesis is seen. Alignment is near anatomic. Vertebral body heights are maintained. Minimal disc height loss is seen in the lumbar spine without significant degenerative change.  The SI joints and visualized sacrum are normal. The visualized bowel and soft tissues are within normal limits.                                       Medications   methocarbamoL tablet 1,000 mg (1,000 mg Oral Given 7/27/25 1141)   HYDROcodone-acetaminophen 5-325 mg per tablet 1 tablet (1 tablet Oral Given 7/27/25 1141)     Medical Decision Making  29-year-old male presents to the emergency department for low back pain x1 week    Considerations include but not limited to musculoskeletal injury, sciatica, vertebral fracture, herniated disc, UTI, epidural abscess    Vitals stable.  Patient afebrile.  Well-appearing on physical exam.  Nontoxic and in no acute distress.  Patient has pain bilaterally in his lower back over the musculoskeletal region of the lumbar spine.  Pain isn't affected when twisting or laterally bending.  Pain is a dull ache that worsens with heavy lifting.  No red flags such as numbness or tingling in the extremities, saddle anesthesia, fever, night sweats, chills, IV drug use.  Patient states that he does lift heavy objects at work all the time.  X-ray of lumbar spine shows no acute osseous abnormality.  His urine is unremarkable.  Patient given pain control here in the emergency department with [place] and Robaxin.  On re-evaluation patient states that his pain is much improved.  He will be discharged with pain control and provided Orthopedics contact information.  He was given very strict return precautions regarding any worsening pain or development of new  symptoms.  He verbalizes understanding and agreed.  All questions were answered at the bedside.          Amount and/or Complexity of Data Reviewed  Radiology: ordered.    Risk  Prescription drug management.                                          Clinical Impression:  Final diagnoses:  [M54.32] Sciatica of left side (Primary)  [M54.42] Acute bilateral low back pain with left-sided sciatica          ED Disposition Condition    Discharge Stable          ED Prescriptions       Medication Sig Dispense Start Date End Date Auth. Provider    methocarbamoL (ROBAXIN) 500 MG Tab Take 2 tablets (1,000 mg total) by mouth 3 (three) times daily. for 3 days 15 tablet 7/27/2025 7/30/2025 Deepika Navarrete PA-C    predniSONE (DELTASONE) 20 MG tablet Take 1 tablet (20 mg total) by mouth once daily. for 4 days 4 tablet 7/27/2025 7/31/2025 Deepika Navarrete PA-C    LIDOcaine (LIDODERM) 5 % Place 1 patch onto the skin once daily. Remove & Discard patch within 12 hours or as directed by MD for 5 days 5 patch 7/27/2025 8/1/2025 Deepika Navarrete PA-C          Follow-up Information       Follow up With Specialties Details Why Contact Info    Mando Avila MD Internal Medicine Call   [address] [facility]  [place] [address]  [phone]      [place] Orthopedic Surgery Call   [address]  [address]  [place] [address]  [phone]                     [1]   Social History  Tobacco Use    Smoking status: Every Day     Current packs/day: 1.00     Average packs/day: 1 pack/day for 7.0 years (7.0 ttl pk-yrs)     Types: Cigarettes, Vaping with nicotine    Smokeless tobacco: Never   Substance Use Topics    Alcohol use: Yes    Drug use: Not Currently        Deepika Navarrete PA-C  07/27/25 8991